# Patient Record
(demographics unavailable — no encounter records)

---

## 2024-10-22 NOTE — PHYSICAL EXAM
[Normal Venous Pressure] : normal venous pressure [Normal S1, S2] : normal S1, S2 [Clear Lung Fields] : clear lung fields [de-identified] : RRR

## 2024-10-22 NOTE — DISCUSSION/SUMMARY
[FreeTextEntry1] : pt with moderate AR cont asa 81  on tamsulosin 0.4 mg  stop Plavix 2022 BUT PATIENT DOES NOT WANT TO STOP  cont metoprolol er 50  pt REFUSING TO STOP SIMVASTATIN/RED YEAST RICE D/W PATIENT AT LENGTH BUT SAYS HAS BEEN ON FOR 30 YEARS. improved diet control  continue benazepril to 20 mg po qhs.  Start Zetia Bloodwork/4 months

## 2024-10-22 NOTE — HISTORY OF PRESENT ILLNESS
[FreeTextEntry1] : Pt with CAD, OLD MI, s/p PCI  LAD, LCX, HTN, HLD  CHANTALE mod AR, Asc Ao: 3.8 cm   : EXERCISE STRESS NUCLEAR: DTS 8.5 NEGATIVE FOR ISCHEMIA  22: LDL 69 unable to tolerate ATORVASTATIN.   55-60% mod ar pht 367 ms, mod mr/ mod tr CO: 4.8L/MIN  23: Pt doing pushups works 2 days per week in [employer], denies cp, sob, dizziness, no swelling noted in LE.   23: EF: 60%, mild LAE, mild WAN, mild-mod AR pt bp intially 160/60 and 94 hr improved to 144/80 to 70  pt was stressed, pt denies cp or sob. pt does his steps daily 36502 steps. pt denies palpitations.  ntbnp: 190, hdl: 58, t, ldl: 60  23:  23: carotid b/l less than 50% stenosis.  pt ? on simvastatin, will switch, pt also should not be on plavix and d/w patient reviewed all meds, stop red yeast powder if taking and d/w patient.  pt does not want to stop RED YEAST RICE BUT REFUSES TO STOP AS TAKING IT FOR 30 YEARS. PT working out doing treadmill and situps and no cp or sob.  pt does 15K steps/day.   24:  24: BNP: 294, HDL: 56, T, LDL: 74 increased  benazepril increased for hypertension  : Echo: lvef 60%, E' sept: 0.08 m/s, E/e; 9, LVOT VTI: 16.4 cm, CO: 3.6 l/min. LAE, MOD TR/AR dd1, Asc Ao: 3.8 cm  pt not drinking any water, pt denies cp or sob and walking and going to gym twice a week.   10/22/24: 10/16/24: BNP: 248, HDL: 55, T, LDL: 72 10/9/24: Right: proximal ICA <50% stenosis. Right: ECA >50% stenosis. Left: proximal ICA <50% stenosis. Vertebral arteries: antegrade flow bilaterally. Subclavian arteries: no significant atherosclerosis bilaterally. Compared to the carotid ultrasound performed on 2023, New RT ECA stenosis. Mild intimal atherosclerosis bilateral CCA.

## 2025-05-05 NOTE — PHYSICAL EXAM
[Normal Venous Pressure] : normal venous pressure [Normal S1, S2] : normal S1, S2 [Clear Lung Fields] : clear lung fields [de-identified] : RRR

## 2025-05-05 NOTE — HISTORY OF PRESENT ILLNESS
[FreeTextEntry1] : Pt with CAD, OLD MI, s/p PCI  LAD, LCX, HTN, HLD  CHANTALE mod AR, Asc Ao: 3.8 cm   : EXERCISE STRESS NUCLEAR: DTS 8.5 NEGATIVE FOR ISCHEMIA  22: LDL 69 unable to tolerate ATORVASTATIN.   55-60% mod ar pht 367 ms, mod mr/ mod tr CO: 4.8L/MIN  23: Pt doing pushups works 2 days per week in [employer], denies cp, sob, dizziness, no swelling noted in LE.   23: EF: 60%, mild LAE, mild WAN, mild-mod AR pt bp intially 160/60 and 94 hr improved to 144/80 to 70  pt was stressed, pt denies cp or sob. pt does his steps daily 24143 steps. pt denies palpitations.  ntbnp: 190, hdl: 58, t, ldl: 60  23:  23: carotid b/l less than 50% stenosis.  pt ? on simvastatin, will switch, pt also should not be on plavix and d/w patient reviewed all meds, stop red yeast powder if taking and d/w patient.  pt does not want to stop RED YEAST RICE BUT REFUSES TO STOP AS TAKING IT FOR 30 YEARS. PT working out doing treadmill and situps and no cp or sob.  pt does 15K steps/day.   24:  24: BNP: 294, HDL: 56, T, LDL: 74 increased  benazepril increased for hypertension  : Echo: lvef 60%, E' sept: 0.08 m/s, E/e; 9, LVOT VTI: 16.4 cm, CO: 3.6 l/min. LAE, MOD TR/AR dd1, Asc Ao: 3.8 cm  pt not drinking any water, pt denies cp or sob and walking and going to gym twice a week.   10/22/24: 10/16/24: BNP: 248, HDL: 55, T, LDL: 72 10/9/24: Right: proximal ICA <50% stenosis. Right: ECA >50% stenosis. Left: proximal ICA <50% stenosis. Vertebral arteries: antegrade flow bilaterally. Subclavian arteries: no significant atherosclerosis bilaterally. Compared to the carotid ultrasound performed on 2023, New RT ECA stenosis. Mild intimal atherosclerosis bilateral CCA. 2025 TG 98 HDL 59 LDL 75 Cr 0.87 Pro  TSH 1.78 HBA1C 5.1%  2025 patient is stable, no chest pain, sob or alexander no syncope EKG sinus rhythm RBBB repeated /70

## 2025-05-05 NOTE — DISCUSSION/SUMMARY
[FreeTextEntry1] : pt with moderate AR cont asa 81  on tamsulosin 0.4 mg  stop Plavix 2022 BUT PATIENT DOES NOT WANT TO STOP  cont metoprolol er 50  pt REFUSING TO STOP SIMVASTATIN/RED YEAST RICE D/W PATIENT AT LENGTH BUT SAYS HAS BEEN ON FOR 30 YEARS. improved diet control  continue benazepril to 20 mg po qhs.  Start Zetia continue current medication  heart healthy diet, exercise and weight contol monitor BP at home 6 months [EKG obtained to assist in diagnosis and management of assessed problem(s)] : EKG obtained to assist in diagnosis and management of assessed problem(s)